# Patient Record
Sex: MALE | URBAN - METROPOLITAN AREA
[De-identification: names, ages, dates, MRNs, and addresses within clinical notes are randomized per-mention and may not be internally consistent; named-entity substitution may affect disease eponyms.]

---

## 2021-12-30 ENCOUNTER — OFFICE VISIT (OUTPATIENT)
Dept: URBAN - METROPOLITAN AREA CLINIC 6 | Facility: CLINIC | Age: 51
End: 2021-12-30
Payer: MEDICAID

## 2021-12-30 DIAGNOSIS — E11.3513 DIABETES MELLITUS TYPE 2 WITH PROLIFERATIVE RETINOPATHY WITH MACULAR EDEMA, BILATERAL: ICD-10-CM

## 2021-12-30 DIAGNOSIS — H40.053 OCULAR HYPERTENSION, BILATERAL: Primary | ICD-10-CM

## 2021-12-30 DIAGNOSIS — H40.53X4 GLAUCOMA OF BILATERAL EYES SECONDARY TO OTHER EYE DISORDER, INDETERMINATE STAGE: ICD-10-CM

## 2021-12-30 PROCEDURE — 92250 FUNDUS PHOTOGRAPHY W/I&R: CPT | Performed by: OPTOMETRIST

## 2021-12-30 PROCEDURE — 92014 COMPRE OPH EXAM EST PT 1/>: CPT | Performed by: OPTOMETRIST

## 2021-12-30 RX ORDER — DORZOLAMIDE HYDROCHLORIDE AND TIMOLOL MALEATE 20; 5 MG/ML; MG/ML
SOLUTION/ DROPS OPHTHALMIC
Qty: 10 | Refills: 5 | Status: ACTIVE
Start: 2021-12-30

## 2021-12-30 ASSESSMENT — INTRAOCULAR PRESSURE
OD: 29
OS: 21

## 2021-12-30 NOTE — IMPRESSION/PLAN
Impression: Diabetes mellitus Type 2 with proliferative retinopathy with macular edema, bilateral: N43.7055. Plan: Pt needs antivegf treatment and potentially PRP. RTC with Dr. RIVERS [facility] for [facility] OD and pre cert avastin OU.

## 2021-12-30 NOTE — IMPRESSION/PLAN
Impression: Ocular hypertension, bilateral: H40.053. Plan: Start Cosopt BID OU. Recheck at next visit.

## 2021-12-30 NOTE — IMPRESSION/PLAN
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 Plan: Neovascular glaucoma with NVI/NVA OU. Start Cosopt BID OU.

## 2023-01-03 ENCOUNTER — OFFICE VISIT (OUTPATIENT)
Dept: URBAN - METROPOLITAN AREA CLINIC 6 | Facility: CLINIC | Age: 53
End: 2023-01-03
Payer: MEDICAID

## 2023-01-03 DIAGNOSIS — H25.813 COMBINED FORMS OF AGE-RELATED CATARACT, BILATERAL: ICD-10-CM

## 2023-01-03 DIAGNOSIS — H40.41X3 GLAUCOMA SECONDARY TO EYE INFLAM, RIGHT EYE, SEVERE STAGE: ICD-10-CM

## 2023-01-03 DIAGNOSIS — H40.42X1 GLAUCOMA SECONDARY TO EYE INFLAM, LEFT EYE, MILD STAGE: ICD-10-CM

## 2023-01-03 DIAGNOSIS — E11.3513 DIABETES MELLITUS TYPE 2 WITH PROLIFERATIVE RETINOPATHY WITH MACULAR EDEMA, BILATERAL: Primary | ICD-10-CM

## 2023-01-03 PROCEDURE — 92014 COMPRE OPH EXAM EST PT 1/>: CPT | Performed by: OPTOMETRIST

## 2023-01-03 PROCEDURE — 76512 OPH US DX B-SCAN: CPT | Performed by: OPTOMETRIST

## 2023-01-03 RX ORDER — BRIMONIDINE TARTRATE 2 MG/ML
0.2 % SOLUTION/ DROPS OPHTHALMIC
Qty: 15 | Refills: 0 | Status: ACTIVE
Start: 2023-01-03

## 2023-01-03 ASSESSMENT — INTRAOCULAR PRESSURE
OD: 54
OS: 23

## 2023-01-03 NOTE — IMPRESSION/PLAN
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 Plan: pt hasn't been instilling cosopt-increase to TID OD and BID OS. Start brimonidine TID OD. 2 Diamox 250mg PO given and 1 gtt cosopt instilled in office. PT denied ocular pain.

## 2023-01-03 NOTE — IMPRESSION/PLAN
Impression: Diabetes mellitus Type 2 with proliferative retinopathy with macular edema, bilateral: Y52.3827. Plan: referred to retina specialists-options provided for pt. B-scan shows no RD/mass OD. Advised head elevation. Discussed the pathophysiology of diabetes and its effect on the eye. Stressed the importance of strong glucose control. Advised of importance of scheduled dilated examinations, and to contact us immediately for any problems or concerns.

## 2023-01-13 ENCOUNTER — OFFICE VISIT (OUTPATIENT)
Dept: URBAN - METROPOLITAN AREA CLINIC 6 | Facility: CLINIC | Age: 53
End: 2023-01-13
Payer: MEDICAID

## 2023-01-13 DIAGNOSIS — H25.813 COMBINED FORMS OF AGE-RELATED CATARACT, BILATERAL: ICD-10-CM

## 2023-01-13 DIAGNOSIS — H40.42X1 GLAUCOMA SECONDARY TO EYE INFLAM, LEFT EYE, MILD STAGE: ICD-10-CM

## 2023-01-13 DIAGNOSIS — H40.41X3 GLAUCOMA SECONDARY TO EYE INFLAM, RIGHT EYE, SEVERE STAGE: ICD-10-CM

## 2023-01-13 DIAGNOSIS — E11.3513 DIABETES MELLITUS TYPE 2 WITH PROLIFERATIVE RETINOPATHY WITH MACULAR EDEMA, BILATERAL: Primary | ICD-10-CM

## 2023-01-13 PROCEDURE — 92012 INTRM OPH EXAM EST PATIENT: CPT | Performed by: OPTOMETRIST

## 2023-01-13 RX ORDER — DORZOLAMIDE HYDROCHLORIDE AND TIMOLOL MALEATE 20; 5 MG/ML; MG/ML
SOLUTION/ DROPS OPHTHALMIC
Qty: 10 | Refills: 5 | Status: ACTIVE
Start: 2023-01-13

## 2023-01-13 ASSESSMENT — INTRAOCULAR PRESSURE
OS: 14
OD: 63

## 2023-01-13 NOTE — IMPRESSION/PLAN
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 Plan: IOP elevated but noncompliance. Cosopt TID OD, BID OS; brimonidine TID OD. 2 Diamox 250mg PO given and 1 gtt cosopt instilled in office. PT denied ocular pain. We'll call Dr. Harry Moreau for an appt today or early next week. Recheck at next visit.

## 2023-01-13 NOTE — IMPRESSION/PLAN
Impression: Diabetes mellitus Type 2 with proliferative retinopathy with macular edema, bilateral: Y23.0371. Plan: pt has transportation/financial issue so difficult to see retina specialist in [place] [address]. Manuel Beaulieu for possible injection. Advised head elevation. Discussed the pathophysiology of diabetes and its effect on the eye. Stressed the importance of strong glucose control. Advised of importance of scheduled dilated examinations, and to contact us immediately for any problems or concerns.

## 2023-02-02 ENCOUNTER — OFFICE VISIT (OUTPATIENT)
Dept: URBAN - METROPOLITAN AREA SURGERY 2 | Facility: SURGERY | Age: 53
End: 2023-02-02
Payer: MEDICAID

## 2023-02-02 DIAGNOSIS — H40.41X3 GLAUCOMA SECONDARY TO EYE INFLAM, RIGHT EYE, SEVERE STAGE: Primary | ICD-10-CM

## 2023-02-02 DIAGNOSIS — E11.3522 DIABETES MELLITUS TYPE 2 WITH PROLIFERATIVE DIABETIC RETINOPATHY WITH TRACTION RETINAL DETACHMENT INVOLVING THE MACULA, LEFT EYE: ICD-10-CM

## 2023-02-02 PROCEDURE — 92134 CPTRZ OPH DX IMG PST SGM RTA: CPT | Performed by: OPHTHALMOLOGY

## 2023-02-02 PROCEDURE — 92002 INTRM OPH EXAM NEW PATIENT: CPT | Performed by: OPHTHALMOLOGY

## 2023-02-02 RX ORDER — BRIMONIDINE TARTRATE, TIMOLOL MALEATE 2; 5 MG/ML; MG/ML
SOLUTION/ DROPS OPHTHALMIC
Qty: 10 | Refills: 2 | Status: ACTIVE
Start: 2023-02-02

## 2023-02-02 RX ORDER — ACETAZOLAMIDE 250 MG/1
250 MG TABLET ORAL
Qty: 14 | Refills: 0 | Status: ACTIVE
Start: 2023-02-02

## 2023-02-02 ASSESSMENT — INTRAOCULAR PRESSURE
OS: 13
OD: 65

## 2023-02-02 NOTE — IMPRESSION/PLAN
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 Plan: Diabetes Mellitus Type II with Proliferative Diabetic Retinopathy and macula involving tractional detachment OS- MOCT  ordered today. Discussed the pathophysiology of diabetes and its effect on the eye. Stressed the importance of strong glucose control. Advised of importance of scheduled dilated examinations, and to contact us immediately for any problems or concerns. Patient was referred to a retina specialist for further evaluation and management in [address] [place] [address] but couldn't go due to transportation issues. Will give referral for Dr. Danielle Stephens in [facility].

## 2023-02-02 NOTE — IMPRESSION/PLAN
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 Plan: IOP significantly elevated. Restart Cosopt TID OD, BID OS; brimonidine TID OD. 2 Diamox 250mg PO given and 1 gtt combigan instilled in office. PT denied ocular pain. We'll call Dr. Thomas Marrero for an appt STAT for emergency tube surgery. IF CAN'T SEE THEM THIS WEEK INSTRUCTED HIM TO GO TO DR. BLEVINS IN [facility] BEFORE THE WEEKEND TO GET THE SX DONE.